# Patient Record
Sex: FEMALE | Race: ASIAN | URBAN - METROPOLITAN AREA
[De-identification: names, ages, dates, MRNs, and addresses within clinical notes are randomized per-mention and may not be internally consistent; named-entity substitution may affect disease eponyms.]

---

## 2023-05-02 ENCOUNTER — OFFICE VISIT (OUTPATIENT)
Dept: URBAN - METROPOLITAN AREA CLINIC 23 | Facility: CLINIC | Age: 43
End: 2023-05-02
Payer: COMMERCIAL

## 2023-05-02 ENCOUNTER — WEB ENCOUNTER (OUTPATIENT)
Dept: URBAN - METROPOLITAN AREA CLINIC 23 | Facility: CLINIC | Age: 43
End: 2023-05-02

## 2023-05-02 ENCOUNTER — LAB OUTSIDE AN ENCOUNTER (OUTPATIENT)
Dept: URBAN - METROPOLITAN AREA CLINIC 23 | Facility: CLINIC | Age: 43
End: 2023-05-02

## 2023-05-02 VITALS
BODY MASS INDEX: 26.66 KG/M2 | TEMPERATURE: 98.2 F | WEIGHT: 160 LBS | DIASTOLIC BLOOD PRESSURE: 57 MMHG | SYSTOLIC BLOOD PRESSURE: 134 MMHG | HEART RATE: 102 BPM | HEIGHT: 65 IN

## 2023-05-02 DIAGNOSIS — B18.1 HEPATITIS B CARRIER: ICD-10-CM

## 2023-05-02 PROCEDURE — 99204 OFFICE O/P NEW MOD 45 MIN: CPT | Performed by: INTERNAL MEDICINE

## 2023-05-02 NOTE — HPI-TODAY'S VISIT:
42 years old female presented today for evaluation for hepatitis BS antigen positive.  She had hep B S antigen when she was pregnant in 2012 her liver enzymes are normal she is hepatitis B carrier.  Recently noted increased skin rashes and itching she is taking Zyrtec for that.  She had recent labs showed hepatitis B S antigen positive hepatitis B core antibody positive hepatitis C negative her liver function tests were completely normal platelets are normal.  She is here to discuss management of positive hepatitis B S antigen.

## 2023-05-06 PROBLEM — 235871004: Status: ACTIVE | Noted: 2023-05-06

## 2023-05-19 ENCOUNTER — LAB OUTSIDE AN ENCOUNTER (OUTPATIENT)
Dept: URBAN - METROPOLITAN AREA CLINIC 23 | Facility: CLINIC | Age: 43
End: 2023-05-19

## 2023-05-22 ENCOUNTER — WEB ENCOUNTER (OUTPATIENT)
Dept: URBAN - METROPOLITAN AREA CLINIC 23 | Facility: CLINIC | Age: 43
End: 2023-05-22

## 2023-05-23 ENCOUNTER — LAB OUTSIDE AN ENCOUNTER (OUTPATIENT)
Dept: URBAN - METROPOLITAN AREA CLINIC 23 | Facility: CLINIC | Age: 43
End: 2023-05-23

## 2023-06-01 LAB
ALBUMIN/GLOBULIN RATIO: 1.5
ALBUMIN: 4.2
ALKALINE PHOSPHATASE: 97
ALT (SGPT): 12
AST (SGOT): 14
BCP MUTATIONS: NOT DETECTED
BILIRUBIN, DIRECT: 0.1
BILIRUBIN, INDIRECT: 0.4
BILIRUBIN, TOTAL: 0.5
GLOBULIN: 2.8
HBV GENOTYPE:: (no result)
HEP BE AB: REACTIVE
HEP BE AG: (no result)
HEPATITIS B SURFACE AB IMMUNITY, QN: <5
HEPATITIS B SURFACE ANTIGEN: REACTIVE
HEPATITIS B VIRUS DNA: 3.88
HEPATITIS B VIRUS DNA: 7640
POLYMERASE MUTATIONS:: NOT DETECTED
PRECORE MUTATION: DETECTED
PROTEIN, TOTAL: 7

## 2023-06-02 ENCOUNTER — WEB ENCOUNTER (OUTPATIENT)
Dept: URBAN - METROPOLITAN AREA CLINIC 23 | Facility: CLINIC | Age: 43
End: 2023-06-02